# Patient Record
Sex: FEMALE | Race: BLACK OR AFRICAN AMERICAN | URBAN - METROPOLITAN AREA
[De-identification: names, ages, dates, MRNs, and addresses within clinical notes are randomized per-mention and may not be internally consistent; named-entity substitution may affect disease eponyms.]

---

## 2023-09-19 ENCOUNTER — APPOINTMENT (RX ONLY)
Dept: URBAN - METROPOLITAN AREA CLINIC 12 | Facility: CLINIC | Age: 58
Setting detail: DERMATOLOGY
End: 2023-09-19

## 2023-09-19 DIAGNOSIS — Z41.1 ENCOUNTER FOR COSMETIC SURGERY: ICD-10-CM

## 2023-09-19 DIAGNOSIS — L90.5 SCAR CONDITIONS AND FIBROSIS OF SKIN: ICD-10-CM

## 2023-09-19 PROCEDURE — ? COUNSELING - SCAR

## 2023-09-19 PROCEDURE — ? CONSULTATION - AESTHETIC EAR DEFORMITY

## 2023-09-19 PROCEDURE — ? PATIENT SPECIFIC COUNSELING

## 2023-09-19 PROCEDURE — 99202 OFFICE O/P NEW SF 15 MIN: CPT

## 2023-09-19 ASSESSMENT — LOCATION DETAILED DESCRIPTION DERM
LOCATION DETAILED: RIGHT PROXIMAL PRETIBIAL REGION
LOCATION DETAILED: RIGHT ANTERIOR EARLOBE
LOCATION DETAILED: RIGHT DISTAL PRETIBIAL REGION
LOCATION DETAILED: LEFT DISTAL PRETIBIAL REGION
LOCATION DETAILED: RIGHT ANTERIOR EARLOBE
LOCATION DETAILED: LEFT PROXIMAL PRETIBIAL REGION

## 2023-09-19 ASSESSMENT — LOCATION ZONE DERM
LOCATION ZONE: LEG
LOCATION ZONE: EAR
LOCATION ZONE: EAR

## 2023-09-19 ASSESSMENT — LOCATION SIMPLE DESCRIPTION DERM
LOCATION SIMPLE: LEFT PRETIBIAL REGION
LOCATION SIMPLE: RIGHT EAR
LOCATION SIMPLE: RIGHT PRETIBIAL REGION
LOCATION SIMPLE: RIGHT EAR

## 2023-09-19 NOTE — HPI: EAR (EAR DEFORMITY)
Which Ear(S) Are You Concerned About?: right ear
How Severe Is The Deformity?: moderate
Is This A New Presentation, Or A Follow-Up?: Ear Deformity

## 2023-09-19 NOTE — PROCEDURE: PATIENT SPECIFIC COUNSELING
Other (Free Text): Patient presents for ear aesthetic deformity (right ear lobe) consultation and hyperpigmentation scarring consultation. Patient has a split ear lobe and would like to repair her ear possibly. Patient also has hyperpigmentation scaring due to mosquito bites on her ankles. \\Jun Balbuena examined the patient and voiced that she is a great candidate for ear lobe repair. The ear repair is a procedure done here in the office. Dr. Balbuena advised the patient to see her dermatologist regarding the mosquito bite scars. Patient verbalized understanding. Ayleen will be contacting the patient regarding the quote.
Detail Level: Zone

## 2024-10-04 ENCOUNTER — APPOINTMENT (RX ONLY)
Dept: URBAN - METROPOLITAN AREA CLINIC 12 | Facility: CLINIC | Age: 59
Setting detail: DERMATOLOGY
End: 2024-10-04

## 2024-10-04 DIAGNOSIS — Z41.1 ENCOUNTER FOR COSMETIC SURGERY: ICD-10-CM

## 2024-10-04 PROCEDURE — ? EAR LOBE REPAIR COSMETIC

## 2024-10-04 ASSESSMENT — LOCATION SIMPLE DESCRIPTION DERM: LOCATION SIMPLE: RIGHT EAR

## 2024-10-04 ASSESSMENT — LOCATION DETAILED DESCRIPTION DERM: LOCATION DETAILED: RIGHT ANTERIOR EARLOBE

## 2024-10-04 ASSESSMENT — LOCATION ZONE DERM: LOCATION ZONE: EAR

## 2024-10-04 NOTE — PROCEDURE: EAR LOBE REPAIR COSMETIC
Suture Removal: 7 days
Repair Without Preservation Of Piercehole Text: The torn portion of the earlobe was excised completely with a #15 scalpel blade to create fresh edges. The edges were undermined slightly to allow them to splay for proper closure.
Anesthesia Volume In Cc: 3
Consent: The benefits, alternatives, risks, and complications of earlobe repair were discussed including, but not limited to, the risks of pain, infection, bleeding, persistent or worse cosmetic defect, inability to hold earrings, requirement for additional piercing, asymmetry, and need for further repair, among others.
Estimated Blood Loss (Cc): minimal
Surgeon: Dr. Fer Balbuena
Detail Level: Detailed
Positioning: The patient was placed in the standard supine position in the usual manner.
Anesthesia Type: 1% lidocaine with epinephrine
Epidermal Closure: simple interrupted
Repair With Preservation Of Piercehole Text: The torn portion of the earlobe was excised completely with a #15 scalpel blade to create fresh edges. The existing piercehole was maintained. The edges were undermined slightly to allow them to splay for proper closure.
Epidermal Sutures: 6-0 Prolene
Repair, Right Ear: Repair without Preservation of Piercehole
Hemostasis: electrocautery
Repair With Preservation Of Piercehole And Stent Text: The torn portion of the earlobe was excised completely with a #15 scalpel blade to create fresh edges. The edges were undermined slightly to allow them to splay for closure. A stent was created to create a new piercehole at the time of repair and placed in appropriate position.
Price $ (Use Numbers Only, No Text Please.): 793.45

## 2024-10-15 ENCOUNTER — APPOINTMENT (RX ONLY)
Dept: URBAN - METROPOLITAN AREA CLINIC 12 | Facility: CLINIC | Age: 59
Setting detail: DERMATOLOGY
End: 2024-10-15

## 2024-10-15 DIAGNOSIS — Z48.89 ENCOUNTER FOR OTHER SPECIFIED SURGICAL AFTERCARE: ICD-10-CM

## 2024-10-15 PROCEDURE — ? COUNSELING - POST-OP CHECK

## 2024-10-15 PROCEDURE — ? SUTURE REMOVAL

## 2024-10-15 PROCEDURE — 99024 POSTOP FOLLOW-UP VISIT: CPT

## 2024-10-15 PROCEDURE — ? PATIENT SPECIFIC COUNSELING

## 2024-10-15 PROCEDURE — ? POST-OP CHECK

## 2024-10-15 ASSESSMENT — LOCATION ZONE DERM: LOCATION ZONE: EAR

## 2024-10-15 ASSESSMENT — LOCATION SIMPLE DESCRIPTION DERM: LOCATION SIMPLE: RIGHT EAR

## 2024-10-15 ASSESSMENT — LOCATION DETAILED DESCRIPTION DERM: LOCATION DETAILED: RIGHT ANTERIOR EARLOBE

## 2024-10-15 NOTE — PROCEDURE: PATIENT SPECIFIC COUNSELING
Other (Free Text): Patient presents s/p right earlobe repair performed on 10/04/24, for suture removal. Patient voiced doing well.\\n\\nDrPavel Balbuena examined the patient and voiced\\n- Everything is looking good; healing well\\n- sutures ready to come out\\n- swelling will reduce; can get re-pierced in 2 months\\nPatient indicated understanding\\nRTC PRN
Detail Level: Zone